# Patient Record
Sex: FEMALE | URBAN - METROPOLITAN AREA
[De-identification: names, ages, dates, MRNs, and addresses within clinical notes are randomized per-mention and may not be internally consistent; named-entity substitution may affect disease eponyms.]

---

## 2020-11-10 ENCOUNTER — APPOINTMENT (RX ONLY)
Dept: URBAN - METROPOLITAN AREA CLINIC 55 | Facility: CLINIC | Age: 45
Setting detail: DERMATOLOGY
End: 2020-11-10

## 2021-10-27 ENCOUNTER — APPOINTMENT (RX ONLY)
Dept: URBAN - METROPOLITAN AREA CLINIC 55 | Facility: CLINIC | Age: 46
Setting detail: DERMATOLOGY
End: 2021-10-27

## 2021-10-27 DIAGNOSIS — Z41.9 ENCOUNTER FOR PROCEDURE FOR PURPOSES OTHER THAN REMEDYING HEALTH STATE, UNSPECIFIED: ICD-10-CM

## 2021-10-27 PROCEDURE — ? BOTOX

## 2021-10-27 NOTE — PROCEDURE: BOTOX
Show Depressor Anguli Units: Yes
Lot #: [id number]
Show Ucl Units: No
Lcl Root Units: 0
Expiration Date (Month Year): 11/30/23
Detail Level: Detailed
Post-Care Instructions: Patient instructed to not lie down for 4 hours and limit physical activity for 24 hours.
Additional Area 2 Location: Lip
Periorbital Skin Units: 4
Glabellar Complex Units: 16
Forehead Units: 6
Additional Area 1 Location: under eye “jelly roll”
Consent: Written consent obtained. Risks include but not limited to lid/brow ptosis, bruising, swelling, diplopia, temporary effect, incomplete chemical denervation.

## 2021-11-10 ENCOUNTER — APPOINTMENT (RX ONLY)
Dept: URBAN - METROPOLITAN AREA CLINIC 55 | Facility: CLINIC | Age: 46
Setting detail: DERMATOLOGY
End: 2021-11-10

## 2021-11-10 DIAGNOSIS — Z41.9 ENCOUNTER FOR PROCEDURE FOR PURPOSES OTHER THAN REMEDYING HEALTH STATE, UNSPECIFIED: ICD-10-CM

## 2021-11-10 PROCEDURE — ? FILLERS

## 2021-11-10 NOTE — PROCEDURE: FILLERS
Jawline Filler Volume In Cc: 0
Lot #: [id number]
Filler: Restylane Contour
Detail Level: Detailed
Include Cannula Information In Note?: No
Lot #: [id number]
Anesthesia Type: 1% lidocaine with epinephrine
Include Cannula Size?: 27G
Expiration Date (Month Year): 10/31/2022
Anesthesia Volume In Cc: 0.3
Price (Use Numbers Only, No Special Characters Or $): 445
Lot #: [id number]
Post-Care Instructions: Patient instructed to apply ice to reduce swelling. Post care handout given to patient.
Expiration Date (Month Year): 02/28/23
Expiration Date (Month Year): 06/20/2024
Additional Anesthesia Volume In Cc: 6
Consent: Written consent obtained. Risks include but not limited to bruising, beading, irregular texture, ulceration, infection, allergic reaction, scar formation, incomplete augmentation, temporary nature, procedural pain.
Map Statment: See Attach Map for Complete Details

## 2022-04-01 ENCOUNTER — APPOINTMENT (RX ONLY)
Dept: URBAN - METROPOLITAN AREA CLINIC 55 | Facility: CLINIC | Age: 47
Setting detail: DERMATOLOGY
End: 2022-04-01

## 2022-04-01 DIAGNOSIS — Z41.9 ENCOUNTER FOR PROCEDURE FOR PURPOSES OTHER THAN REMEDYING HEALTH STATE, UNSPECIFIED: ICD-10-CM

## 2022-04-01 PROCEDURE — ? BOTOX

## 2022-04-01 NOTE — PROCEDURE: BOTOX
Show Lcl Units: No
Show Additional Area 2: Yes
Lateral Platysmal Bands Units: 0
Periorbital Skin Units: 8
Additional Area 1 Location: under eye “jelly roll”
Additional Area 4 Location: gummy smile
Additional Area 2 Location: Lip
Lot #: [id number]
Additional Area 3 Location: Nasalis
Expiration Date (Month Year): 05/31/24
Forehead Units: 6
Consent: Written consent obtained. Risks include but not limited to lid/brow ptosis, bruising, swelling, diplopia, temporary effect, incomplete chemical denervation.
Dilution (U/0.1 Cc): 4
Post-Care Instructions: Patient instructed to not lie down for 4 hours and limit physical activity for 24 hours.
Glabellar Complex Units: 16
Detail Level: Detailed

## 2022-08-05 ENCOUNTER — APPOINTMENT (RX ONLY)
Dept: URBAN - METROPOLITAN AREA CLINIC 55 | Facility: CLINIC | Age: 47
Setting detail: DERMATOLOGY
End: 2022-08-05

## 2022-08-05 DIAGNOSIS — Z41.9 ENCOUNTER FOR PROCEDURE FOR PURPOSES OTHER THAN REMEDYING HEALTH STATE, UNSPECIFIED: ICD-10-CM

## 2022-08-05 PROCEDURE — ? BOTOX

## 2022-08-05 PROCEDURE — ? INVENTORY

## 2022-08-05 NOTE — PROCEDURE: BOTOX
Levator Labii Superioris Units: 0
Show Topical Anesthesia: Yes
Show Right And Left Periorbital Units: No
Periorbital Skin Units: 8
Dilution (U/0.1 Cc): 4
Forehead Units: 6
Consent obtained. Risks include but not limited to lid/brow ptosis, bruising, swelling, diplopia, temporary effect, incomplete chemical denervation.
Detail Level: Detailed
Glabellar Complex Units: 16
Post-Care Instructions: Patient instructed to not lie down for 4 hours and limit physical activity for 24 hours.
Show Inventory Tab: Hide

## 2022-11-22 ENCOUNTER — APPOINTMENT (RX ONLY)
Dept: URBAN - METROPOLITAN AREA CLINIC 55 | Facility: CLINIC | Age: 47
Setting detail: DERMATOLOGY
End: 2022-11-22

## 2022-11-22 DIAGNOSIS — Z41.9 ENCOUNTER FOR PROCEDURE FOR PURPOSES OTHER THAN REMEDYING HEALTH STATE, UNSPECIFIED: ICD-10-CM

## 2022-11-22 PROCEDURE — ? INVENTORY

## 2022-11-22 PROCEDURE — ? BOTOX

## 2022-11-22 NOTE — PROCEDURE: BOTOX
Show Topical Anesthesia: Yes
Lcl Root Units: 0
Forehead Units: 6
Additional Area 1 Location: Lip
Glabellar Complex Units: 16
Show Right And Left Periorbital Units: No
Show Inventory Tab: Hide
Dilution (U/0.1 Cc): 4
Consent obtained. Risks include but not limited to lid/brow ptosis, bruising, swelling, diplopia, temporary effect, incomplete chemical denervation.
Periorbital Skin Units: 8
Additional Area 2 Location: Nasalis
Post-Care Instructions: Patient instructed to not lie down for 4 hours and limit physical activity for 24 hours.
Detail Level: Detailed

## 2023-07-25 ENCOUNTER — APPOINTMENT (RX ONLY)
Dept: URBAN - METROPOLITAN AREA CLINIC 55 | Facility: CLINIC | Age: 48
Setting detail: DERMATOLOGY
End: 2023-07-25

## 2023-07-25 DIAGNOSIS — Z41.9 ENCOUNTER FOR PROCEDURE FOR PURPOSES OTHER THAN REMEDYING HEALTH STATE, UNSPECIFIED: ICD-10-CM

## 2023-07-25 PROCEDURE — ? INVENTORY

## 2023-07-25 PROCEDURE — ? BOTOX

## 2023-07-25 NOTE — PROCEDURE: BOTOX
Show Right And Left Pupillary Line Units: No
Additional Area 1 Units: 0
Show Periorbital Units: Yes
Glabellar Complex Units: 16
Detail Level: Detailed
Periorbital Skin Units: 8
Additional Area 3 Location: gummy smile
Incrementing Botox Units: By 0.5 Units
Consent obtained. Risks include but not limited to lid/brow ptosis, bruising, swelling, diplopia, temporary effect, incomplete chemical denervation.
Additional Area 2 Location: Nasalis
Dilution (U/0.1 Cc): 4
Show Inventory Tab: Hide
Additional Area 1 Location: Lip
Post-Care Instructions: Patient instructed to not lie down for 4 hours and limit physical activity for 24 hours.
Forehead Units: 6

## 2023-11-21 ENCOUNTER — APPOINTMENT (RX ONLY)
Dept: URBAN - METROPOLITAN AREA CLINIC 55 | Facility: CLINIC | Age: 48
Setting detail: DERMATOLOGY
End: 2023-11-21

## 2023-11-21 DIAGNOSIS — Z41.9 ENCOUNTER FOR PROCEDURE FOR PURPOSES OTHER THAN REMEDYING HEALTH STATE, UNSPECIFIED: ICD-10-CM

## 2023-11-21 PROCEDURE — ? INVENTORY

## 2023-11-21 PROCEDURE — ? BOTOX

## 2024-03-13 ENCOUNTER — APPOINTMENT (RX ONLY)
Dept: URBAN - METROPOLITAN AREA CLINIC 55 | Facility: CLINIC | Age: 49
Setting detail: DERMATOLOGY
End: 2024-03-13

## 2024-03-13 DIAGNOSIS — Z41.9 ENCOUNTER FOR PROCEDURE FOR PURPOSES OTHER THAN REMEDYING HEALTH STATE, UNSPECIFIED: ICD-10-CM

## 2024-03-13 PROCEDURE — ? BOTOX

## 2024-03-13 PROCEDURE — ? INVENTORY

## 2024-03-13 NOTE — PROCEDURE: BOTOX
Lateral Platysmal Bands Units: 0
Show Glabellar Units: Yes
Additional Area 3 Location: gummy smile
Consent obtained. Risks include but not limited to lid/brow ptosis, bruising, swelling, diplopia, temporary effect, incomplete chemical denervation.
Show Right And Left Brow Units: No
Show Inventory Tab: Hide
Forehead Units: 6
Dilution (U/0.1 Cc): 4
Glabellar Complex Units: 16
Post-Care Instructions: Patient instructed to not lie down for 4 hours and limit physical activity for 24 hours.
Additional Area 2 Location: Nasalis
Periorbital Skin Units: 8
Detail Level: Detailed
Additional Area 1 Location: Lip
Incrementing Botox Units: By 0.5 Units

## 2024-03-18 ENCOUNTER — APPOINTMENT (RX ONLY)
Dept: URBAN - METROPOLITAN AREA CLINIC 55 | Facility: CLINIC | Age: 49
Setting detail: DERMATOLOGY
End: 2024-03-18

## 2024-03-18 DIAGNOSIS — Z41.9 ENCOUNTER FOR PROCEDURE FOR PURPOSES OTHER THAN REMEDYING HEALTH STATE, UNSPECIFIED: ICD-10-CM

## 2024-03-18 PROCEDURE — ? CHEMICAL PEEL

## 2024-03-18 PROCEDURE — ? INVENTORY

## 2024-03-18 ASSESSMENT — LOCATION ZONE DERM: LOCATION ZONE: FACE

## 2024-03-18 ASSESSMENT — LOCATION DETAILED DESCRIPTION DERM: LOCATION DETAILED: GLABELLA

## 2024-03-18 ASSESSMENT — LOCATION SIMPLE DESCRIPTION DERM: LOCATION SIMPLE: GLABELLA

## 2024-03-18 NOTE — PROCEDURE: CHEMICAL PEEL
Number Of Layers: 2
Consent: Prior to the procedure, written consent was obtained and risks were reviewed, including but not limited to: redness, peeling, blistering, pigmentary change, scarring, infection, and pain.
Prep: The treated area was degreased with pre-peel cleanser, and vaseline was applied for protection of mucous membranes.
Post Peel Care: After the procedure, a post-peel cream was applied to the treated areas. Sun protection and post-care instructions were reviewed with the patient.
Treatment Number: 0
Post-Care Instructions: I reviewed with the patient in detail post-care instructions. Patient should avoid sun exposure and wear sun protection.
Detail Level: Zone
Chemical Peel: Skin [employer]

## 2024-07-26 ENCOUNTER — APPOINTMENT (RX ONLY)
Dept: URBAN - METROPOLITAN AREA CLINIC 55 | Facility: CLINIC | Age: 49
Setting detail: DERMATOLOGY
End: 2024-07-26

## 2024-07-26 DIAGNOSIS — Z41.9 ENCOUNTER FOR PROCEDURE FOR PURPOSES OTHER THAN REMEDYING HEALTH STATE, UNSPECIFIED: ICD-10-CM

## 2024-07-26 PROCEDURE — ? BOTOX

## 2024-07-26 PROCEDURE — ? INVENTORY

## 2024-07-26 PROCEDURE — ? SIGNATURE HYDRAFACIAL

## 2024-07-26 ASSESSMENT — LOCATION ZONE DERM: LOCATION ZONE: FACE

## 2024-07-26 ASSESSMENT — LOCATION SIMPLE DESCRIPTION DERM: LOCATION SIMPLE: GLABELLA

## 2024-07-26 ASSESSMENT — LOCATION DETAILED DESCRIPTION DERM: LOCATION DETAILED: GLABELLA

## 2024-07-26 NOTE — PROCEDURE: SIGNATURE HYDRAFACIAL
Tip: Hydropeel Tip, Blue
Vacuum Pressure Low Setting (Will Not Render If Set To 0): 0
Tip: Hydropeel Tip, Teal
Indication: anti-aging
Solution Override
Procedure: Exfoliation
Tip: Hydropeel Tip, Clear
Location: face
Procedure: Peel
Procedure: Extraction
Solution: Activ-4
Procedure: Fusion
Consent: Written consent obtained, risks reviewed including but not limited to crusting, scabbing, blistering, scarring, darker or lighter pigmentary change, bruising, and/or incomplete response.
Procedure: Boost
Tip Override
Solution: GlySal 15%
Treatment Number: 1
Procedure: Extend and Protect
Post-Care Instructions: I reviewed with the patient in detail post-care instructions. Patient should stay away from the sun and wear sun protection until treated areas are fully healed.
Solution: Antiox-6
Solution: Beta-HD

## 2024-07-26 NOTE — PROCEDURE: BOTOX
Depressor Anguli Oris Units: 0
Show Additional Area 3: Yes
Show Right And Left Pupillary Line Units: No
Additional Area 2 Location: Nasalis
Show Inventory Tab: Hide
Forehead Units: 6
Additional Area 4 Location: chin
Glabellar Complex Units: 16
Dilution (U/0.1 Cc): 4
Additional Area 1 Location: Lip
Consent obtained. Risks include but not limited to lid/brow ptosis, bruising, swelling, diplopia, temporary effect, incomplete chemical denervation.
Additional Area 3 Location: gummy smile
Post-Care Instructions: Patient instructed to not lie down for 4 hours and limit physical activity for 24 hours.
Detail Level: Detailed
Periorbital Skin Units: 8
Incrementing Botox Units: By 0.5 Units

## 2024-08-09 ENCOUNTER — APPOINTMENT (RX ONLY)
Dept: URBAN - METROPOLITAN AREA CLINIC 55 | Facility: CLINIC | Age: 49
Setting detail: DERMATOLOGY
End: 2024-08-09

## 2024-11-26 ENCOUNTER — APPOINTMENT (RX ONLY)
Dept: URBAN - METROPOLITAN AREA CLINIC 55 | Facility: CLINIC | Age: 49
Setting detail: DERMATOLOGY
End: 2024-11-26

## 2024-11-26 DIAGNOSIS — Z41.9 ENCOUNTER FOR PROCEDURE FOR PURPOSES OTHER THAN REMEDYING HEALTH STATE, UNSPECIFIED: ICD-10-CM

## 2024-11-26 PROCEDURE — ? BOTOX

## 2024-11-26 PROCEDURE — ? INVENTORY

## 2024-11-26 NOTE — PROCEDURE: BOTOX
Levator Labii Superioris Units: 0
Show Masseter Units: Yes
Additional Area 4 Location: Chin
Show Right And Left Pupillary Line Units: No
Additional Area 3 Location: gummy smile
Additional Area 4 Units: 10
Periorbital Skin Units: 8
Detail Level: Detailed
Additional Area 2 Location: Nasalis
Incrementing Botox Units: By 0.5 Units
Dilution (U/0.1 Cc): 4
Consent obtained. Risks include but not limited to lid/brow ptosis, bruising, swelling, diplopia, temporary effect, incomplete chemical denervation.
Show Inventory Tab: Hide
Additional Area 1 Location: Lip
Forehead Units: 6
Post-Care Instructions: Patient instructed to not lie down for 4 hours and limit physical activity for 24 hours.
Glabellar Complex Units: 16

## 2024-12-10 NOTE — PROCEDURE: BOTOX
Show Forehead Units: Yes
Glabellar Complex Units: 16
Additional Area 4 Units: 0
Show Right And Left Pupillary Line Units: No
Dilution (U/0.1 Cc): 4
Consent obtained. Risks include but not limited to lid/brow ptosis, bruising, swelling, diplopia, temporary effect, incomplete chemical denervation.
Additional Area 1 Location: Lip
Post-Care Instructions: Patient instructed to not lie down for 4 hours and limit physical activity for 24 hours.
Detail Level: Detailed
Periorbital Skin Units: 8
Incrementing Botox Units: By 0.5 Units
Additional Area 2 Location: Nasalis
Denies recreational drug use
Show Inventory Tab: Hide
Additional Area 3 Location: gummy smile
Forehead Units: 6

## 2025-02-17 ENCOUNTER — APPOINTMENT (OUTPATIENT)
Dept: URBAN - METROPOLITAN AREA CLINIC 55 | Facility: CLINIC | Age: 50
Setting detail: DERMATOLOGY
End: 2025-02-17

## 2025-02-17 DIAGNOSIS — Z41.9 ENCOUNTER FOR PROCEDURE FOR PURPOSES OTHER THAN REMEDYING HEALTH STATE, UNSPECIFIED: ICD-10-CM

## 2025-02-17 PROCEDURE — ? INVENTORY

## 2025-02-17 PROCEDURE — ? DAXXIFY

## 2025-02-17 NOTE — PROCEDURE: DAXXIFY
Show Forehead Units: Yes
Left Periorbital Skin Units: 0
Show Right And Left Pupillary Line Units: No
Forehead Units: 14
Detail Level: Detailed
Glabellar Complex Units: 32
Additional Area 1 Location: lip
Show Inventory Tab: Hide
Consent: Written consent obtained. Risks include but not limited to lid/brow ptosis, bruising, swelling, diplopia, temporary effect, incomplete chemical denervation.
Additional Area 2 Location: chin
Post-Care Instructions: Patient instructed to not lie down for 4 hours and limit physical activity for 24 hours.
Additional Area 2 Units: 12
Periorbital Skin Units: 16
Dilution (U/0.1 Cc): 4
Bill Summary Price Listed Below, Or Bill Total Of Units X Price Per Unit?: Bill Summary Price Below

## 2025-04-17 ENCOUNTER — APPOINTMENT (OUTPATIENT)
Dept: URBAN - METROPOLITAN AREA CLINIC 55 | Facility: CLINIC | Age: 50
Setting detail: DERMATOLOGY
End: 2025-04-17

## 2025-04-17 DIAGNOSIS — Z41.9 ENCOUNTER FOR PROCEDURE FOR PURPOSES OTHER THAN REMEDYING HEALTH STATE, UNSPECIFIED: ICD-10-CM

## 2025-04-17 PROCEDURE — ? SCULPTRA

## 2025-04-17 PROCEDURE — ? INVENTORY

## 2025-04-17 NOTE — PROCEDURE: SCULPTRA
Dilution Method: The Sculptra was diluted with 8 ml of sterile water and 2 ml 1% lidocaine for a total volume of 10ccs for each vial.
Show Local Anesthesia?: Yes
Left Jawline Filler Volume In Cc: 0
Show Right And Left Cheek Volume?: No
Injection Technique: The Sculptra was injected to the listed areas after cleansing the skin and providing appropriate anesthesia.
Detail Level: Detailed
Vials Reconstituted (Required For Inventory): 1
Consent was obtained.
Map Statement: See Attached Map for Complete Details.
Anesthesia Type: 1% lidocaine with epinephrine
Show Inventory Tab: Hide
Additional Anesthesia Volume In Cc: 6
Anesthesia Volume In Cc: 0.5
Post-Care Instructions: Patient instructed to apply ice to reduce swelling.

## 2025-05-13 ENCOUNTER — APPOINTMENT (OUTPATIENT)
Dept: URBAN - METROPOLITAN AREA CLINIC 55 | Facility: CLINIC | Age: 50
Setting detail: DERMATOLOGY
End: 2025-05-13

## 2025-05-13 DIAGNOSIS — Z41.9 ENCOUNTER FOR PROCEDURE FOR PURPOSES OTHER THAN REMEDYING HEALTH STATE, UNSPECIFIED: ICD-10-CM

## 2025-05-13 PROCEDURE — ? CHEMICAL PEEL

## 2025-05-13 PROCEDURE — ? INVENTORY

## 2025-05-13 ASSESSMENT — LOCATION ZONE DERM: LOCATION ZONE: FACE

## 2025-05-13 ASSESSMENT — LOCATION SIMPLE DESCRIPTION DERM: LOCATION SIMPLE: GLABELLA

## 2025-05-13 ASSESSMENT — LOCATION DETAILED DESCRIPTION DERM: LOCATION DETAILED: GLABELLA

## 2025-05-13 NOTE — PROCEDURE: CHEMICAL PEEL
Prep: The treated area was degreased with pre-peel cleanser, and vaseline was applied for protection of mucous membranes.
Treatment Number: 2
Post Peel Care: After the procedure, a post-peel cream was applied to the treated areas. Sun protection and post-care instructions were reviewed with the patient.
Post-Care Instructions: I reviewed with the patient in detail post-care instructions. Patient should avoid sun exposure and wear sun protection.
Consent: Prior to the procedure, written consent was obtained and risks were reviewed, including but not limited to: redness, peeling, blistering, pigmentary change, scarring, infection, and pain.
Detail Level: Zone
Chemical Peel: Skin [employer]

## 2025-06-02 ENCOUNTER — APPOINTMENT (OUTPATIENT)
Dept: URBAN - METROPOLITAN AREA CLINIC 55 | Facility: CLINIC | Age: 50
Setting detail: DERMATOLOGY
End: 2025-06-02

## 2025-06-02 DIAGNOSIS — Z41.9 ENCOUNTER FOR PROCEDURE FOR PURPOSES OTHER THAN REMEDYING HEALTH STATE, UNSPECIFIED: ICD-10-CM

## 2025-06-02 PROCEDURE — ? INVENTORY

## 2025-06-02 PROCEDURE — ? SCULPTRA

## 2025-06-02 NOTE — PROCEDURE: SCULPTRA
Right Middle Malar Filler Volume In Cc: 0
Show Zygomatic Arches Volume?: Yes
Vials Reconstituted (Required For Inventory): 1
Show Right And Left Middle Malar Volume?: No
Post-Care Instructions: Patient instructed to apply ice to reduce swelling.
Detail Level: Detailed
Anesthesia Type: 1% lidocaine with epinephrine
Dilution Method: The Sculptra was diluted with 8 ml of sterile water and 2 ml 1% lidocaine for a total volume of 10ccs for each vial.
Map Statement: See Attached Map for Complete Details.
Anesthesia Volume In Cc: 0.5
Show Inventory Tab: Hide
Injection Technique: The Sculptra was injected to the listed areas after cleansing the skin and providing appropriate anesthesia.
Consent was obtained.
Additional Anesthesia Volume In Cc: 6

## 2025-07-14 ENCOUNTER — APPOINTMENT (OUTPATIENT)
Dept: URBAN - METROPOLITAN AREA CLINIC 55 | Facility: CLINIC | Age: 50
Setting detail: DERMATOLOGY
End: 2025-07-14

## 2025-07-14 DIAGNOSIS — Z41.9 ENCOUNTER FOR PROCEDURE FOR PURPOSES OTHER THAN REMEDYING HEALTH STATE, UNSPECIFIED: ICD-10-CM

## 2025-07-14 PROCEDURE — ? SCULPTRA

## 2025-07-14 PROCEDURE — ? DAXXIFY

## 2025-07-14 PROCEDURE — ? INVENTORY

## 2025-07-14 NOTE — PROCEDURE: SCULPTRA
Show Right And Left Temple Volume?: No
Show Depth Variable?: Yes
Left Lateral Face Filler Volume In Cc: 0
Show Inventory Tab: Show
Dilution Method: The Sculptra was diluted with 8 ml of sterile water and 2 ml 1% lidocaine for a total volume of 10ccs for each vial.
Injection Technique: The Sculptra was injected to the listed areas after cleansing the skin and providing appropriate anesthesia.
Anesthesia Volume In Cc: 0.5
Consent was obtained.
Map Statement: See Attached Map for Complete Details.
Additional Anesthesia Volume In Cc: 6
Post-Care Instructions: Patient instructed to apply ice to reduce swelling.
Detail Level: Detailed
Vials Reconstituted (Required For Inventory): 1
Anesthesia Type: 1% lidocaine with epinephrine

## 2025-07-14 NOTE — PROCEDURE: DAXXIFY
Additional Area 4 Location: Nasalis
Anterior Platysmal Bands Units: 0
Show Orbicularis Oculi Units: Yes
Detail Level: Detailed
Forehead Units: 14
Show Right And Left Pupillary Line Units: No
Additional Area 1 Location: chin
Dilution (U/0.1 Cc): 4
Glabellar Complex Units: 32
Show Inventory Tab: Hide
Additional Area 1 Units: 12
Consent: Written consent obtained. Risks include but not limited to lid/brow ptosis, bruising, swelling, diplopia, temporary effect, incomplete chemical denervation.
Periorbital Skin Units: 16
Post-Care Instructions: Patient instructed to not lie down for 4 hours and limit physical activity for 24 hours.
Bill Summary Price Listed Below, Or Bill Total Of Units X Price Per Unit?: Bill Summary Price Below